# Patient Record
Sex: MALE | Race: WHITE | Employment: UNEMPLOYED | URBAN - NONMETROPOLITAN AREA
[De-identification: names, ages, dates, MRNs, and addresses within clinical notes are randomized per-mention and may not be internally consistent; named-entity substitution may affect disease eponyms.]

---

## 2021-01-08 ENCOUNTER — APPOINTMENT (OUTPATIENT)
Dept: GENERAL RADIOLOGY | Age: 36
End: 2021-01-08
Attending: EMERGENCY MEDICINE
Payer: COMMERCIAL

## 2021-01-08 ENCOUNTER — HOSPITAL ENCOUNTER (OUTPATIENT)
Age: 36
Setting detail: OBSERVATION
Discharge: LEFT BEFORE DISCHARGE | End: 2021-01-09
Attending: EMERGENCY MEDICINE | Admitting: INTERNAL MEDICINE
Payer: COMMERCIAL

## 2021-01-08 DIAGNOSIS — J44.1 COPD EXACERBATION (HCC): Primary | ICD-10-CM

## 2021-01-08 LAB
ALBUMIN SERPL-MCNC: 4.2 G/DL (ref 3.5–5)
ALBUMIN/GLOB SERPL: 0.9 {RATIO} (ref 1.1–2.2)
ALP SERPL-CCNC: 88 U/L (ref 45–117)
ALT SERPL-CCNC: 18 U/L (ref 12–78)
ANION GAP SERPL CALC-SCNC: 11 MMOL/L (ref 5–15)
AST SERPL W P-5'-P-CCNC: 10 U/L (ref 15–37)
BASOPHILS # BLD: 0.1 K/UL (ref 0–0.2)
BASOPHILS NFR BLD: 0 % (ref 0–2.5)
BILIRUB SERPL-MCNC: 0.6 MG/DL (ref 0.2–1)
BUN SERPL-MCNC: 27 MG/DL (ref 6–20)
BUN/CREAT SERPL: 26 (ref 12–20)
CA-I BLD-MCNC: 9.9 MG/DL (ref 8.5–10.1)
CHLORIDE SERPL-SCNC: 93 MMOL/L (ref 97–108)
CO2 SERPL-SCNC: 31 MMOL/L (ref 21–32)
CREAT SERPL-MCNC: 1.04 MG/DL (ref 0.7–1.3)
EOSINOPHIL # BLD: 0 K/UL (ref 0–0.7)
EOSINOPHIL NFR BLD: 0 % (ref 0.9–2.9)
ERYTHROCYTE [DISTWIDTH] IN BLOOD BY AUTOMATED COUNT: 14.4 % (ref 11.5–14.5)
FLUAV AG NPH QL IA: NEGATIVE
FLUBV AG NOSE QL IA: NEGATIVE
GLOBULIN SER CALC-MCNC: 4.6 G/DL (ref 2–4)
GLUCOSE SERPL-MCNC: 113 MG/DL (ref 65–100)
HCT VFR BLD AUTO: 50.4 % (ref 41–53)
HGB BLD-MCNC: 16.7 G/DL (ref 13.5–17.5)
LACTATE SERPL-SCNC: 1.6 MMOL/L (ref 0.4–2)
LYMPHOCYTES # BLD: 1.5 K/UL (ref 1–4.8)
LYMPHOCYTES NFR BLD: 13 % (ref 20.5–51.1)
MCH RBC QN AUTO: 29.5 PG (ref 31–34)
MCHC RBC AUTO-ENTMCNC: 33.2 G/DL (ref 31–36)
MCV RBC AUTO: 88.8 FL (ref 80–100)
MONOCYTES # BLD: 0.9 K/UL (ref 0.2–2.4)
MONOCYTES NFR BLD: 7 % (ref 1.7–9.3)
NEUTS SEG # BLD: 9.7 K/UL (ref 1.8–7.7)
NEUTS SEG NFR BLD: 80 % (ref 42–75)
NRBC # BLD: 0.01 K/UL
NRBC BLD-RTO: 10 PER 100 WBC
PLATELET # BLD AUTO: 379 K/UL
PMV BLD AUTO: 8.6 FL (ref 6.5–11.5)
POTASSIUM SERPL-SCNC: 3.8 MMOL/L (ref 3.5–5.1)
PROT SERPL-MCNC: 8.8 G/DL (ref 6.4–8.2)
RBC # BLD AUTO: 5.68 M/UL (ref 4.5–5.9)
SARS-COV-2, COV2: NORMAL
SODIUM SERPL-SCNC: 135 MMOL/L (ref 136–145)
WBC # BLD AUTO: 12.2 K/UL (ref 4.4–11.3)

## 2021-01-08 PROCEDURE — 85025 COMPLETE CBC W/AUTO DIFF WBC: CPT

## 2021-01-08 PROCEDURE — 74011250636 HC RX REV CODE- 250/636: Performed by: EMERGENCY MEDICINE

## 2021-01-08 PROCEDURE — 87635 SARS-COV-2 COVID-19 AMP PRB: CPT

## 2021-01-08 PROCEDURE — 99218 HC RM OBSERVATION: CPT

## 2021-01-08 PROCEDURE — 96374 THER/PROPH/DIAG INJ IV PUSH: CPT

## 2021-01-08 PROCEDURE — 87040 BLOOD CULTURE FOR BACTERIA: CPT

## 2021-01-08 PROCEDURE — 71045 X-RAY EXAM CHEST 1 VIEW: CPT

## 2021-01-08 PROCEDURE — 87804 INFLUENZA ASSAY W/OPTIC: CPT

## 2021-01-08 PROCEDURE — 99283 EMERGENCY DEPT VISIT LOW MDM: CPT

## 2021-01-08 PROCEDURE — 83605 ASSAY OF LACTIC ACID: CPT

## 2021-01-08 PROCEDURE — 80053 COMPREHEN METABOLIC PANEL: CPT

## 2021-01-08 PROCEDURE — 36415 COLL VENOUS BLD VENIPUNCTURE: CPT

## 2021-01-08 RX ORDER — ACETAMINOPHEN 325 MG/1
650 TABLET ORAL
Status: DISCONTINUED | OUTPATIENT
Start: 2021-01-08 | End: 2021-01-09 | Stop reason: HOSPADM

## 2021-01-08 RX ORDER — ACETAMINOPHEN 650 MG/1
650 SUPPOSITORY RECTAL
Status: DISCONTINUED | OUTPATIENT
Start: 2021-01-08 | End: 2021-01-09 | Stop reason: HOSPADM

## 2021-01-08 RX ORDER — PROMETHAZINE HYDROCHLORIDE 25 MG/1
12.5 TABLET ORAL
Status: DISCONTINUED | OUTPATIENT
Start: 2021-01-08 | End: 2021-01-09 | Stop reason: HOSPADM

## 2021-01-08 RX ORDER — POLYETHYLENE GLYCOL 3350 17 G/17G
17 POWDER, FOR SOLUTION ORAL DAILY PRN
Status: DISCONTINUED | OUTPATIENT
Start: 2021-01-08 | End: 2021-01-09 | Stop reason: HOSPADM

## 2021-01-08 RX ORDER — IPRATROPIUM BROMIDE AND ALBUTEROL SULFATE 2.5; .5 MG/3ML; MG/3ML
3 SOLUTION RESPIRATORY (INHALATION)
Status: DISCONTINUED | OUTPATIENT
Start: 2021-01-09 | End: 2021-01-09 | Stop reason: HOSPADM

## 2021-01-08 RX ORDER — HYDRALAZINE HYDROCHLORIDE 20 MG/ML
10 INJECTION INTRAMUSCULAR; INTRAVENOUS
Status: DISCONTINUED | OUTPATIENT
Start: 2021-01-08 | End: 2021-01-09 | Stop reason: HOSPADM

## 2021-01-08 RX ORDER — SODIUM CHLORIDE 0.9 % (FLUSH) 0.9 %
5-40 SYRINGE (ML) INJECTION EVERY 8 HOURS
Status: DISCONTINUED | OUTPATIENT
Start: 2021-01-08 | End: 2021-01-09 | Stop reason: HOSPADM

## 2021-01-08 RX ORDER — ENOXAPARIN SODIUM 100 MG/ML
40 INJECTION SUBCUTANEOUS DAILY
Status: DISCONTINUED | OUTPATIENT
Start: 2021-01-09 | End: 2021-01-09 | Stop reason: HOSPADM

## 2021-01-08 RX ORDER — IBUPROFEN 200 MG
1 TABLET ORAL DAILY
Status: DISCONTINUED | OUTPATIENT
Start: 2021-01-08 | End: 2021-01-09 | Stop reason: HOSPADM

## 2021-01-08 RX ORDER — ONDANSETRON 2 MG/ML
4 INJECTION INTRAMUSCULAR; INTRAVENOUS
Status: DISCONTINUED | OUTPATIENT
Start: 2021-01-08 | End: 2021-01-09 | Stop reason: HOSPADM

## 2021-01-08 RX ORDER — SODIUM CHLORIDE 0.9 % (FLUSH) 0.9 %
5-40 SYRINGE (ML) INJECTION AS NEEDED
Status: DISCONTINUED | OUTPATIENT
Start: 2021-01-08 | End: 2021-01-09 | Stop reason: HOSPADM

## 2021-01-08 RX ADMIN — METHYLPREDNISOLONE SODIUM SUCCINATE 125 MG: 125 INJECTION, POWDER, FOR SOLUTION INTRAMUSCULAR; INTRAVENOUS at 22:25

## 2021-01-09 VITALS
OXYGEN SATURATION: 20 % | TEMPERATURE: 98.2 F | SYSTOLIC BLOOD PRESSURE: 151 MMHG | WEIGHT: 160.94 LBS | RESPIRATION RATE: 20 BRPM | HEART RATE: 91 BPM | DIASTOLIC BLOOD PRESSURE: 105 MMHG | BODY MASS INDEX: 22.53 KG/M2 | HEIGHT: 71 IN

## 2021-01-09 LAB
COVID-19 RAPID TEST, COVR: NEGATIVE
SPECIMEN SOURCE: NEGATIVE

## 2021-01-09 PROCEDURE — 99218 HC RM OBSERVATION: CPT

## 2021-01-09 PROCEDURE — 74011250637 HC RX REV CODE- 250/637

## 2021-01-09 PROCEDURE — 94640 AIRWAY INHALATION TREATMENT: CPT

## 2021-01-09 PROCEDURE — 74011250637 HC RX REV CODE- 250/637: Performed by: NURSE PRACTITIONER

## 2021-01-09 PROCEDURE — 94760 N-INVAS EAR/PLS OXIMETRY 1: CPT

## 2021-01-09 PROCEDURE — 74011000250 HC RX REV CODE- 250: Performed by: NURSE PRACTITIONER

## 2021-01-09 RX ORDER — ALBUTEROL SULFATE 90 UG/1
AEROSOL, METERED RESPIRATORY (INHALATION)
Status: COMPLETED
Start: 2021-01-09 | End: 2021-01-09

## 2021-01-09 RX ORDER — ALBUTEROL SULFATE 90 UG/1
4 AEROSOL, METERED RESPIRATORY (INHALATION)
Status: DISCONTINUED | OUTPATIENT
Start: 2021-01-09 | End: 2021-01-09 | Stop reason: ALTCHOICE

## 2021-01-09 RX ADMIN — Medication 10 ML: at 01:05

## 2021-01-09 RX ADMIN — ALBUTEROL SULFATE 4 PUFF: 108 AEROSOL, METERED RESPIRATORY (INHALATION) at 00:42

## 2021-01-09 RX ADMIN — ALBUTEROL SULFATE 4 PUFF: 90 AEROSOL, METERED RESPIRATORY (INHALATION) at 00:42

## 2021-01-09 RX ADMIN — IPRATROPIUM BROMIDE AND ALBUTEROL SULFATE 3 ML: .5; 3 SOLUTION RESPIRATORY (INHALATION) at 03:53

## 2021-01-09 NOTE — ED PROVIDER NOTES
EMERGENCY DEPARTMENT HISTORY AND PHYSICAL EXAM  ?    Date: 1/8/2021  Patient Name: Ata Banks    History of Presenting Illness    Patient presents with:  Flu Like Symptoms: Pt reports increased productive cough x 2 days. DX'd at [facility] with pneumonia 1 month ago      History Provided By: Patient    HPI: Ata Banks, 38 y.o. male with a past medical history significant COPD presents to the ED with cc of cough for 2 days and shortness of breath. Cough is productive. He denies rhinorrhea or nasal congestion, denies loss of smell or taste, or fever. He is a current smoker. He was hospitalized for pneumonia in [facility] 1 month ago. There are no other complaints, changes, or physical findings at this time. PCP: UNKNOWN    Current Facility-Administered Medications:    methylPREDNISolone (PF) (Solu-MEDROL) injection 125 mg, 125 mg, IntraVENous, NOW, Miah, Jayy Ochoa MD        Past History    Past Medical History:  History reviewed. No pertinent past medical history. Past Surgical History:  History reviewed. No pertinent surgical history. Family History:  History reviewed. No pertinent family history. Social History:  Social History   Tobacco Use     Smoking status: Current Every Day Smoker       Packs/day: 1.50       Years: 10.00       Pack years: 15     Smokeless tobacco: Never Used   Alcohol use: Yes     Alcohol/week: 6.0 standard drinks     Types: 6 Cans of beer per week   Drug use: Yes     Types: Opiates      Allergies:  No Known Allergies      Review of Systems  @[facility]@    Physical Exam  @[facility]@    Diagnostic Study Results    Labs -  No results found for this or any previous visit (from the past 12 hour(s)). Radiologic Studies -   XR CHEST PORT    (Results Pending)  CT Results  (Last 48 hours)   None     CXR Results  (Last 48 hours)   None       Medical Decision Making and ED Course  I am the first provider for this patient.     I reviewed the vital signs, available nursing notes, past medical history, past surgical history, family history and social history. Vital Signs-Reviewed the patient's vital signs. Empty flowsheet group. Records Reviewed: Nursing Notes    Provider Notes (Medical Decision Making):   Patient presents with COPD poorly controlled outpatient. The patient presents with differential diagnosis of COPD, pneumonia, Covid. ED Course:   Initial assessment performed. The patients presenting problems have been discussed, and they are in agreement with the care plan formulated and outlined with them. I have encouraged them to ask questions as they arise throughout their visit. Disposition    Admitted        Diagnosis    Clinical Impression: OPD exacerbation      Kathy Alcazar MD    Please note that this dictation was completed with [employer], the [employer] voice recognition software. Quite often unanticipated grammatical, syntax, homophones, and other interpretive errors are inadvertently transcribed by the computer software. Please disregard these errors. Please excuse any errors that have escaped final proofreading. Thank you.    ? History reviewed. No pertinent past medical history. History reviewed. No pertinent surgical history. History reviewed. No pertinent family history.     Social History     Socioeconomic History    Marital status:      Spouse name: Not on file    Number of children: Not on file    Years of education: Not on file    Highest education level: Not on file   Occupational History    Not on file   Social Needs    Financial resource strain: Not on file    Food insecurity     Worry: Not on file     Inability: Not on file    Transportation needs     Medical: Not on file     Non-medical: Not on file   Tobacco Use    Smoking status: Current Every Day Smoker     Packs/day: 1.50     Years: 10.00     Pack years: 15.00    Smokeless tobacco: Never Used   Substance and Sexual Activity    Alcohol use: Yes     Alcohol/week: 6.0 standard drinks     Types: 6 Cans of beer per week    Drug use: Yes     Types: Opiates    Sexual activity: Yes     Birth control/protection: Condom   Lifestyle    Physical activity     Days per week: Not on file     Minutes per session: Not on file    Stress: Not on file   Relationships    Social connections     Talks on phone: Not on file     Gets together: Not on file     Attends Restorationist service: Not on file     Active member of club or organization: Not on file     Attends meetings of clubs or organizations: Not on file     Relationship status: Not on file    Intimate partner violence     Fear of current or ex partner: Not on file     Emotionally abused: Not on file     Physically abused: Not on file     Forced sexual activity: Not on file   Other Topics Concern    Not on file   Social History Narrative    Not on file         ALLERGIES: Patient has no known allergies. Review of Systems   Constitutional: Negative. HENT: Negative. Eyes: Negative. Respiratory: Positive for cough, shortness of breath and wheezing. Cardiovascular: Negative. Gastrointestinal: Negative. Endocrine: Negative. Genitourinary: Negative. Musculoskeletal: Negative. Skin: Negative. Allergic/Immunologic: Negative. Neurological: Negative. Hematological: Negative. Psychiatric/Behavioral: Negative. Vitals:    01/08/21 2013   BP: (!) 151/105   Pulse: 93   Resp: 22   Temp: 97.4 °F (36.3 °C)   SpO2: 93%   Weight: 73 kg (160 lb 15 oz)   Height: 5' 11\" (1.803 m)            Physical Exam  Vitals signs and nursing note reviewed. Constitutional:       Appearance: Normal appearance. He is normal weight. HENT:      Head: Normocephalic and atraumatic.       Right Ear: Tympanic membrane, ear canal and external ear normal.      Left Ear: Tympanic membrane, ear canal and external ear normal.      Nose: Nose normal.      Mouth/Throat:      Mouth: Mucous membranes are moist. Pharynx: Oropharynx is clear. Eyes:      Extraocular Movements: Extraocular movements intact. Conjunctiva/sclera: Conjunctivae normal.      Pupils: Pupils are equal, round, and reactive to light. Neck:      Musculoskeletal: Normal range of motion and neck supple. Cardiovascular:      Rate and Rhythm: Normal rate and regular rhythm. Pulses: Normal pulses. Heart sounds: Normal heart sounds. Pulmonary:      Effort: Respiratory distress present. Breath sounds: Wheezing present. Abdominal:      General: Abdomen is flat. Bowel sounds are normal.      Palpations: Abdomen is soft. Musculoskeletal: Normal range of motion. Skin:     General: Skin is warm and dry. Neurological:      General: No focal deficit present. Mental Status: He is alert and oriented to person, place, and time.    Psychiatric:         Mood and Affect: Mood normal.         Behavior: Behavior normal.          MDM       Procedures

## 2021-01-09 NOTE — H&P
History and Physical    Patient: Will Christian MRN: [id number]  SSN: [id number]    YOB: 1985  Age: 38 y.o. Sex: male      Subjective: Jorden Mohs Hux is a 38 y.o. male, current daily smoker, who presents to ED with reports of dyspnea. He endorses a 2-3 day history of productive cough, accompanied by shortness of breath. He states he used albuterol MDI at home with no relief. He does not have a current PCP, and though diagnosed w COPD, he does not appear to have any current medications for same. In ED, CXR is negative. Pt has leukocytosis of 12k, with left shift, but no fever. BUN mildly elevated but with normal Cr. He was given Solu-medrol 125mg IV in ED. Hospitalist consulted for Observation and further management. Past Medical History:   Diagnosis Date    COPD (chronic obstructive pulmonary disease) ([address]     Tobacco dependence      History reviewed. No pertinent surgical history. Family History   Problem Relation Age of Onset    No Known Problems Mother     Diabetes Father      Social History     Tobacco Use    Smoking status: Current Every Day Smoker     Packs/day: 1.50     Years: 10.00     Pack years: 15.00    Smokeless tobacco: Never Used   Substance Use Topics    Alcohol use: Yes     Alcohol/week: 6.0 standard drinks     Types: 6 Cans of beer per week      Prior to Admission medications    Not on File        No Known Allergies    Review of Systems:  Pertinent items are noted in the History of Present Illness. Objective:     Vitals:    01/08/21 2013   BP: (!) 151/105   Pulse: 93   Resp: 22   Temp: 97.4 °F (36.3 °C)   SpO2: 93%   Weight: 73 kg (160 lb 15 oz)   Height: 5' 11\" (1.803 m)        Physical Exam:  GEN: Thin,  male, alert. NAD  HEENT: Atraumatic, EOMi, dry oral mucosa  NECK: Supple, trachea midline, no JVD  PULM: Coarse wheezing and rhonchi throughout. No accessory muscle use. CV/PV: Mildly tachycardic. No M/R/G.  No edema  GI: Soft, non-tender, +BS  : Deferred   MSK: JIN. No joint deformities   INTEG: Warm, dry, intact. NEURO: A+O x 4. CN intact.  No gross focal deficits   PSYCH:No anxiety or agitation     Assessment:     Hospital Problems  Never Reviewed          Codes Class Noted POA    COPD exacerbation ([facility] ICD-10-CM: J44.1  ICD-9-CM: 491.21  1/8/2021 Unknown              Plan:     Place in Observation  DX: COPD exacerbation, Covid PUI  ELOS <2MN  Activity as tolerated  Cardiac Diet  No home meds noted  VTE ppx: LMWH  Medical surrogate is Mercedes Landers, his mother  Pt is a Full Code    COPD exacerbation  - Diffuse wheezing throughout  - Current daily smoker  - No home meds noted  - Continue Solu-Medrol 40mg IV BID  - Dulera 2puffs BID  - DuoNebs Q4H  - Supplemental O2 prn    Covid PUI  - Covid PCR sent - Influenza negative  - No fever, O2 sats 93% room air   - Will obtain rapid Covid as pt symptomatic, and will require nebulized tx  - Observe Droplet Precautions until ruled out   - Add LDH, CRP to labs  - Add Vit C and Zinc    Leukocytosis  - Left shift noted  - No infiltrate seen on CXR, no fever  - Defer abx for now, if febrile, will star empiric tx  - BLD CX x 2 sent  - Obtain UA   - Repeat CBC in am    Elevated BP  - Denies hx of hypertension  - /105  - Hydralazine IV Q6H prn  - Monitor BP    Tobacco Dependence  - Cessation encouraged  - Nicotine patch 21mg every day        Signed By: Mohamud Ruiz NP     January 8, 2021

## 2021-01-09 NOTE — ROUTINE PROCESS
@4886 pt states come take this IV out I'm going home. Instructed to stay until the morning doctor visits, state my mother is coming to get me. Had removed monitor. Elida Mojica, RN notified states call the doctor on call because we can't make him stay. 1 Dr. Jennifer Espinosa notified of pt wants to go home and was told if he is of sound mind can sign out AMA. 54 pt signed AMA paper and states my mother is outside. Accompanied pt outside, ambulated without problems. Talked to pt's mother, states he did the same thing a couple of months ago when he was in the hospital with pneumonia and I'm going to get on him for this.

## 2021-01-09 NOTE — ROUTINE PROCESS
@7487 1/8/2021 admitted to room 222 via wheelchair from ED accompanied by NIKKI Martins RN. Alert and oriented. C/o being thirsty, cup of iced water given. No c/o pain. MA intact RAC. Non prod cough.  @0110 reswabbed nares  for covid19 for invalid results of previous rapid test.

## 2021-01-14 LAB
BACTERIA SPEC CULT: NORMAL
BACTERIA SPEC CULT: NORMAL
SPECIAL REQUESTS,SREQ: NORMAL
SPECIAL REQUESTS,SREQ: NORMAL

## 2021-01-23 NOTE — DISCHARGE SUMMARY
Discharge Summary     37 yo admitted with COPD exacerbation and suspicion for Covid. He left a few hours after admission against medical advice.      DX: COPD Exacerbation  HTN   Tobacco abuse  History of drug use not explored    Annalise Nieves MD

## 2021-03-13 ENCOUNTER — APPOINTMENT (OUTPATIENT)
Dept: CT IMAGING | Age: 36
End: 2021-03-13
Attending: EMERGENCY MEDICINE

## 2021-03-13 ENCOUNTER — APPOINTMENT (OUTPATIENT)
Dept: GENERAL RADIOLOGY | Age: 36
End: 2021-03-13
Attending: EMERGENCY MEDICINE

## 2021-03-13 ENCOUNTER — HOSPITAL ENCOUNTER (EMERGENCY)
Age: 36
Discharge: ANOTHER HOSPITAL | End: 2021-03-13
Attending: EMERGENCY MEDICINE

## 2021-03-13 VITALS
HEART RATE: 104 BPM | RESPIRATION RATE: 18 BRPM | DIASTOLIC BLOOD PRESSURE: 73 MMHG | BODY MASS INDEX: 23.62 KG/M2 | OXYGEN SATURATION: 93 % | TEMPERATURE: 99 F | SYSTOLIC BLOOD PRESSURE: 134 MMHG | HEIGHT: 70 IN | WEIGHT: 165 LBS

## 2021-03-13 DIAGNOSIS — S27.329A CONTUSION OF LUNG, UNSPECIFIED LATERALITY, INITIAL ENCOUNTER: ICD-10-CM

## 2021-03-13 DIAGNOSIS — S72.001A CLOSED RIGHT HIP FRACTURE, INITIAL ENCOUNTER (HCC): Primary | ICD-10-CM

## 2021-03-13 DIAGNOSIS — S51.011A LACERATION OF RIGHT ELBOW, INITIAL ENCOUNTER: ICD-10-CM
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
## 2021-03-13 LAB
APPEARANCE UR: CLEAR
BACTERIA URNS QL MICRO: ABNORMAL /HPF
BILIRUB UR QL: NEGATIVE
COLOR UR: ABNORMAL
GLUCOSE UR STRIP.AUTO-MCNC: NEGATIVE MG/DL
HGB UR QL STRIP: NEGATIVE
KETONES UR QL STRIP.AUTO: ABNORMAL MG/DL
LEUKOCYTE ESTERASE UR QL STRIP.AUTO: NEGATIVE
MUCOUS THREADS URNS QL MICRO: ABNORMAL /LPF
NITRITE UR QL STRIP.AUTO: NEGATIVE
PH UR STRIP: 6.5 [PH] (ref 5–8)
PROT UR STRIP-MCNC: 30 MG/DL
RBC #/AREA URNS HPF: ABNORMAL /HPF (ref 0–3)
SP GR UR REFRACTOMETRY: 1.02 (ref 1–1.03)
UROBILINOGEN UR QL STRIP.AUTO: 0.2 EU/DL (ref 0.2–1)
WBC URNS QL MICRO: ABNORMAL /HPF (ref 0–5)

## 2021-03-13 PROCEDURE — 74176 CT ABD & PELVIS W/O CONTRAST: CPT

## 2021-03-13 PROCEDURE — 99285 EMERGENCY DEPT VISIT HI MDM: CPT

## 2021-03-13 PROCEDURE — 74011250636 HC RX REV CODE- 250/636: Performed by: EMERGENCY MEDICINE

## 2021-03-13 PROCEDURE — 96376 TX/PRO/DX INJ SAME DRUG ADON: CPT

## 2021-03-13 PROCEDURE — 73070 X-RAY EXAM OF ELBOW: CPT

## 2021-03-13 PROCEDURE — 96375 TX/PRO/DX INJ NEW DRUG ADDON: CPT

## 2021-03-13 PROCEDURE — 70450 CT HEAD/BRAIN W/O DYE: CPT

## 2021-03-13 PROCEDURE — 81001 URINALYSIS AUTO W/SCOPE: CPT

## 2021-03-13 PROCEDURE — 71250 CT THORAX DX C-: CPT

## 2021-03-13 PROCEDURE — 96374 THER/PROPH/DIAG INJ IV PUSH: CPT

## 2021-03-13 PROCEDURE — 73080 X-RAY EXAM OF ELBOW: CPT

## 2021-03-13 PROCEDURE — 72125 CT NECK SPINE W/O DYE: CPT

## 2021-03-13 RX ORDER — KETOROLAC TROMETHAMINE 30 MG/ML
60 INJECTION, SOLUTION INTRAMUSCULAR; INTRAVENOUS
Status: DISCONTINUED | OUTPATIENT
Start: 2021-03-13 | End: 2021-03-14 | Stop reason: HOSPADM

## 2021-03-13 RX ORDER — ONDANSETRON 2 MG/ML
4 INJECTION INTRAMUSCULAR; INTRAVENOUS
Status: COMPLETED | OUTPATIENT
Start: 2021-03-13 | End: 2021-03-13

## 2021-03-13 RX ORDER — FENTANYL CITRATE 50 UG/ML
50 INJECTION, SOLUTION INTRAMUSCULAR; INTRAVENOUS
Status: COMPLETED | OUTPATIENT
Start: 2021-03-13 | End: 2021-03-13

## 2021-03-13 RX ORDER — ALBUTEROL SULFATE 2 MG/5ML
2.5 SYRUP ORAL 3 TIMES DAILY
COMMUNITY

## 2021-03-13 RX ORDER — KETOROLAC TROMETHAMINE 30 MG/ML
30 INJECTION, SOLUTION INTRAMUSCULAR; INTRAVENOUS
Status: COMPLETED | OUTPATIENT
Start: 2021-03-13 | End: 2021-03-13

## 2021-03-13 RX ADMIN — FENTANYL CITRATE 50 MCG: 50 INJECTION, SOLUTION INTRAMUSCULAR; INTRAVENOUS at 19:12

## 2021-03-13 RX ADMIN — FENTANYL CITRATE 50 MCG: 50 INJECTION, SOLUTION INTRAMUSCULAR; INTRAVENOUS at 21:26

## 2021-03-13 RX ADMIN — ONDANSETRON 4 MG: 2 INJECTION INTRAMUSCULAR; INTRAVENOUS at 21:25

## 2021-03-13 RX ADMIN — KETOROLAC TROMETHAMINE 30 MG: 30 INJECTION, SOLUTION INTRAMUSCULAR at 19:09

## 2021-03-13 NOTE — ED NOTES
Pt taken directly to RM 5 for triage in wheelchair. Dr Rema Andrews at bedside. Offered to assist pt from wheelchair to stretcher. Pt stated \"Don't touch me or i'll punch you\". Obvious deformity to R lower leg (internally rotated), R upper arm controlled bleeding noted. Wife reports pt fell at least 25 feet from scaffolding. Pt refused triage at this time. Requesting to go outside to smoke cigarette. This RN left bedside. Roro Barksdale RN and Dr Rema Andrews remain at bedside.

## 2021-03-14 NOTE — ED PROVIDER NOTES
HPI   Patient is a 38 y.o. male WHITE who presents to the ER with a chief complaint of right arm pain and right hip pain after falling from a crane that came a loose  Today  Patient states that he fell on top of 4 sheets of 4 sheets plywould with pain in right hip and ribcage pain. No LOC. Patient has been refusing treatment in the ER. Chief Complaint   Patient presents with   Monica Marcos     pt presents to ED with complaint of call from a roof more than 25 feet up. Pt states that stark came loose and pt fell landing ontop of 4 sheets of ply wood. Pt presents A&Ox4, complaining of right hip/leg pain. right elbow pain. Ribcage pain, pt has a pmh of COPD. Past Medical History:   Diagnosis Date    COPD (chronic obstructive pulmonary disease) ([address]     Tobacco dependence        No past surgical history on file. Family History:   Problem Relation Age of Onset    No Known Problems Mother     Diabetes Father        Social History     Socioeconomic History    Marital status:      Spouse name: Not on file    Number of children: Not on file    Years of education: Not on file    Highest education level: Not on file   Occupational History    Not on file   Social Needs    Financial resource strain: Not on file    Food insecurity     Worry: Not on file     Inability: Not on file   [employer] needs     Medical: Not on file     Non-medical: Not on file   Tobacco Use    Smoking status: Current Every Day Smoker     Packs/day: 1.50     Years: 10.00     Pack years: 15.00    Smokeless tobacco: Never Used   Substance and Sexual Activity    Alcohol use:  Yes     Alcohol/week: 6.0 standard drinks     Types: 6 Cans of beer per week    Drug use: Yes     Types: Opiates    Sexual activity: Yes     Birth control/protection: Condom   Lifestyle    Physical activity     Days per week: Not on file     Minutes per session: Not on file    Stress: Not on file   Relationships    Social connections     Talks on phone: Not on file     Gets together: Not on file     Attends Judaism service: Not on file     Active member of club or organization: Not on file     Attends meetings of clubs or organizations: Not on file     Relationship status: Not on file    Intimate partner violence     Fear of current or ex partner: Not on file     Emotionally abused: Not on file     Physically abused: Not on file     Forced sexual activity: Not on file   Other Topics Concern    Not on file   Social History Narrative    Not on file         ALLERGIES: Patient has no known allergies. Review of Systems   Unable to perform ROS: Other     Patient has been refusing treatment and CT Scan  Vitals:    03/13/21 1913   BP: (!) 146/65   Pulse: (!) 110   Resp: 24   Temp: 99.4 °F (37.4 °C)   SpO2: (!) 89%   Weight: 74.8 kg (165 lb)   Height: 5' 10\" (1.778 m)            Physical Exam  Vitals signs and nursing note reviewed. Constitutional:       Appearance: Normal appearance. He is normal weight. HENT:      Head: Normocephalic. Nose: Nose normal.   Eyes:      Pupils: Pupils are equal, round, and reactive to light. Neck:      Musculoskeletal: Normal range of motion. Cardiovascular:      Rate and Rhythm: Normal rate. Pulses: Normal pulses. Pulmonary:      Effort: Pulmonary effort is normal.   Abdominal:      General: Abdomen is flat. Bowel sounds are normal.      Palpations: Abdomen is soft. Musculoskeletal: Normal range of motion. Comments: Decrease ROM of right hip. Patient has been refusing care. Skin:     General: Skin is warm. Capillary Refill: Capillary refill takes less than 2 seconds. Findings: Lesion present. Comments: Laceration of right elbow   Neurological:      General: No focal deficit present. Mental Status: He is alert.    Psychiatric:         Mood and Affect: Mood normal.          MDM  Number of Diagnoses or Management Options     Amount and/or Complexity of Data Reviewed  Tests in the radiology section of CPT®: ordered and reviewed    Risk of Complications, Morbidity, and/or Mortality  General comments: Patient has been argumentative with staff going back and forth patient has if he should stay on not wanting to go out and smoke cigarettes. He initially refused to be seen after the explaining to him that he could die if he has fractures he agreed to come in for further evaluation. Critical care time 40mins, review of ct scans, IV Fentanyl X 2, Toradol IV, Zofran, , consultation and transfer to another hospital with a higher level of care. Patient Progress  Patient progress: stable     [facility] and refused to allow us to clean the laceration of right elbow. He further refused to     Procedures      Dx. ICD-10-CM ICD-9-CM    1. Closed right hip fracture, initial encounter ([facility]  S72.001A 820.8    2. Contusion of lung, unspecified laterality, initial encounter  I18.715Z 861.21    3. Laceration of right elbow, initial encounter  S51.011A 881.01        ICD-10-CM ICD-9-CM    1. Closed right hip fracture, initial encounter ([facility]  S72.001A 820.8    2. Contusion of lung, unspecified laterality, initial encounter  C87.608Q 861.21    3. Laceration of right elbow, initial encounter  S51.011A 881.01    4. Fall, initial encounter  [address] Aixa Emery C106.2

## 2021-03-14 NOTE — ED NOTES
Assumed care of pt, bedside report completed with Gin Grey RN. Pt initially left facility and refused triage, he has since returned and was agreeable to removing all clothing for exposure and change into gown. Airway patent and intact, breath sounds diminished to R side, pt with controlled bleeding to the R upper arm, Upon exposing pt, obvious deformity noted to R lower leg, pt yelling at staff and refusing check of distal pulses. Pt refusing C-collar, oxygen level 85% on RA and refusing to wear NC--pt finally agreeable to place NC loosely on face. Pt's complaints R shoulder, R hip and R lower leg. MD made aware that pt is now agreeable to imaging, orders are placed and pt taken by imaging staff.

## 2021-03-14 NOTE — ED NOTES
Pt now back to room, Pt and Mother updated on [facility] ETA.  Pt refusing to get on stretcher after multiple requests from this RN, sitting in wheelchair, now agreeable to updated VS.

## 2021-03-14 NOTE — ED NOTES
Pt needs to be transferred for trauma services, MD General at bedside to discuss transfer with pt, pt now refusing transfer and requested to go outside to smoke while thinking about it. MD Rashid at bedside and agreeable to this. Pt pushed out of ED by wife in wheelchair.

## 2021-03-14 NOTE — ED NOTES
Pt smoking and talking on the phone in front of hospital, pt refusing to agree to a transfer at this time. MD aware.

## 2021-03-14 NOTE — ED TRIAGE NOTES
.  Chief Complaint   Patient presents with   [facility] Fall     pt presents to ED with complaint of call from a roof more than 25 feet up. Pt states that stark came loose and pt fell landing ontop of 4 sheets of ply wood. Pt presents A&Ox4, complaining of right hip/leg pain. right elbow pain. Ribcage pain, pt has a pmh of COPD.

## 2021-03-14 NOTE — ED NOTES
[id number] [facility] contacted for transport, ETA of \"a few hours\"    This RN to room to inform pt, pt outside smoking, MD aware

## 2021-03-14 NOTE — ED NOTES
TRANSFER - OUT REPORT:    Verbal report given to Maureen Callejas RN(name) on Dereck Landers  being transferred to [facility] ED(unit) for urgent transfer       Report consisted of patients Situation, Background, Assessment and   Recommendations(SBAR). Information from the following report(s) SBAR and ED Summary was reviewed with the receiving nurse. Lines:   Peripheral IV 03/13/21 Left Antecubital (Active)   Site Assessment Clean, dry, & intact 03/13/21 1908   Phlebitis Assessment 0 03/13/21 1908   Infiltration Assessment 0 03/13/21 1908   Dressing Status Clean, dry, & intact 03/13/21 1908        Opportunity for questions and clarification was provided.       Patient transported with:   Monitor, [facility] transport crew

## 2021-03-14 NOTE — ED NOTES
Pt refusing tetanus shot, stating he has recently had two. Pt agreeable to letting this RN unwrap his elbow, clean and rewrap the laceration, large laceration noted, bleeding controlled.

## 2021-03-14 NOTE — ED NOTES
Transfer center called to initiate transfer to [facility], per request from MD General.

## 2022-03-19 PROBLEM — J44.1 COPD EXACERBATION (HCC): Status: ACTIVE | Noted: 2021-01-08
